# Patient Record
Sex: MALE | Race: OTHER | Employment: FULL TIME | URBAN - METROPOLITAN AREA
[De-identification: names, ages, dates, MRNs, and addresses within clinical notes are randomized per-mention and may not be internally consistent; named-entity substitution may affect disease eponyms.]

---

## 2021-04-29 ENCOUNTER — OFFICE VISIT (OUTPATIENT)
Dept: SURGERY | Facility: CLINIC | Age: 46
End: 2021-04-29
Payer: COMMERCIAL

## 2021-04-29 VITALS
BODY MASS INDEX: 27.4 KG/M2 | WEIGHT: 185 LBS | HEIGHT: 69 IN | DIASTOLIC BLOOD PRESSURE: 80 MMHG | HEART RATE: 77 BPM | TEMPERATURE: 97 F | SYSTOLIC BLOOD PRESSURE: 132 MMHG

## 2021-04-29 DIAGNOSIS — Z01.818 PRE-OP TESTING: ICD-10-CM

## 2021-04-29 DIAGNOSIS — S39.013A STRAIN OF MUSCLE OF RIGHT GROIN REGION: ICD-10-CM

## 2021-04-29 DIAGNOSIS — K42.9 UMBILICAL HERNIA WITHOUT OBSTRUCTION AND WITHOUT GANGRENE: ICD-10-CM

## 2021-04-29 DIAGNOSIS — D12.6 ADENOMATOUS POLYP OF COLON, UNSPECIFIED PART OF COLON: Primary | ICD-10-CM

## 2021-04-29 PROCEDURE — 99244 OFF/OP CNSLTJ NEW/EST MOD 40: CPT | Performed by: COLON & RECTAL SURGERY

## 2021-04-29 PROCEDURE — 3075F SYST BP GE 130 - 139MM HG: CPT | Performed by: COLON & RECTAL SURGERY

## 2021-04-29 PROCEDURE — 3079F DIAST BP 80-89 MM HG: CPT | Performed by: COLON & RECTAL SURGERY

## 2021-04-29 PROCEDURE — 3008F BODY MASS INDEX DOCD: CPT | Performed by: COLON & RECTAL SURGERY

## 2021-04-29 RX ORDER — SODIUM, POTASSIUM,MAG SULFATES 17.5-3.13G
SOLUTION, RECONSTITUTED, ORAL ORAL
Qty: 1 KIT | Refills: 0 | Status: SHIPPED | OUTPATIENT
Start: 2021-04-29

## 2021-04-29 NOTE — PATIENT INSTRUCTIONS
The patient presents today in consultation for 2 separate problems involving 2 different organ systems. The patient's first issue that he presents for today is a history of colon polyps.   The patient had a colonoscopy performed by myself in 2016 seconda personal history of colon polyps, we will be scheduling him to undergo a [facility].   I discussed with the patient that he requires colonoscopy every 3 years secondary to his personal history of colon poly

## 2021-04-29 NOTE — PROGRESS NOTES
Follow Up Visit Note       Active Problems      1. Adenomatous polyp of colon, unspecified part of colon    2. Strain of muscle of right groin region    3. Umbilical hernia without obstruction and without gangrene    4.  Pre-op testing          Chief Compla after eating. He states it will pop out and he will push it back in. He states that it will cause him some discomfort when it is out. It does not cause him any distention, nausea, or vomiting. The patient has had no prior abdominal operations.   He ha children: Not on file      Years of education: Not on file      Highest education level: Not on file    Tobacco Use      Smoking status: Never Smoker    Substance and Sexual Activity      Alcohol use: Yes        Comment: occasionally      Drug use: No    O Wt 185 lb (83.9 kg)   BMI 27.32 kg/m²   Physical Exam  Vitals and nursing note reviewed. Constitutional:       General: He is not in acute distress. Appearance: Normal appearance. He is well-developed. He is not diaphoretic.    HENT:      Head: Normoc Mental Status: He is alert and oriented to person, place, and time. Psychiatric:         Behavior: Behavior normal.         Thought Content:  Thought content normal.         Judgment: Judgment normal.          Assessment   Adenomatous polyp of colon, reveals it to be soft, nondistended, nontender, bowel sounds are normal activity normal pitch. There  is no rebounding tenderness or guarding. There are no signs of ascites or peritonitis. The liver and spleen are nonpalpable.   The patient does have a s Orders Placed This Encounter      Covid-19 Testing by PCR ([facility])      Imaging & Referrals   None    Follow Up  Return for Colonoscopy.     Army Anshul MD

## 2021-05-10 ENCOUNTER — LAB ENCOUNTER (OUTPATIENT)
Dept: LAB | Facility: HOSPITAL | Age: 46
End: 2021-05-10
Attending: COLON & RECTAL SURGERY
Payer: COMMERCIAL

## 2021-05-10 DIAGNOSIS — Z01.818 PRE-PROCEDURAL EXAMINATION: ICD-10-CM

## 2021-05-13 ENCOUNTER — LAB REQUISITION (OUTPATIENT)
Dept: LAB | Facility: HOSPITAL | Age: 46
End: 2021-05-13
Payer: COMMERCIAL

## 2021-05-13 PROCEDURE — 88305 TISSUE EXAM BY PATHOLOGIST: CPT | Performed by: COLON & RECTAL SURGERY

## 2021-05-13 PROCEDURE — 88305 TISSUE EXAM BY PATHOLOGIST: CPT | Performed by: OTOLARYNGOLOGY

## 2021-06-03 ENCOUNTER — PATIENT OUTREACH (OUTPATIENT)
Dept: SURGERY | Facility: CLINIC | Age: 46
End: 2021-06-03

## 2021-06-03 ENCOUNTER — TELEPHONE (OUTPATIENT)
Dept: SURGERY | Facility: CLINIC | Age: 46
End: 2021-06-03

## 2021-06-03 NOTE — TELEPHONE ENCOUNTER
Called pathology as colonoscopy path listed under Chris Bedolla. Informed them Kristine Almanzar completed this procedure and pathology should be under his name. Pathology will correct physician.

## 2021-06-04 ENCOUNTER — MED REC SCAN ONLY (OUTPATIENT)
Dept: SURGERY | Facility: CLINIC | Age: 46
End: 2021-06-04

## 2021-06-21 ENCOUNTER — OFFICE VISIT (OUTPATIENT)
Dept: SURGERY | Facility: CLINIC | Age: 46
End: 2021-06-21
Payer: COMMERCIAL

## 2021-06-21 VITALS
HEIGHT: 69 IN | DIASTOLIC BLOOD PRESSURE: 80 MMHG | WEIGHT: 185 LBS | SYSTOLIC BLOOD PRESSURE: 128 MMHG | BODY MASS INDEX: 27.4 KG/M2 | TEMPERATURE: 97 F | HEART RATE: 75 BPM

## 2021-06-21 DIAGNOSIS — D12.4 ADENOMATOUS POLYP OF DESCENDING COLON: ICD-10-CM

## 2021-06-21 DIAGNOSIS — S39.013A STRAIN OF MUSCLE OF RIGHT GROIN REGION: ICD-10-CM

## 2021-06-21 DIAGNOSIS — K40.90 RIGHT INGUINAL HERNIA: Primary | ICD-10-CM

## 2021-06-21 DIAGNOSIS — K57.90 DIVERTICULOSIS: ICD-10-CM

## 2021-06-21 DIAGNOSIS — K42.9 UMBILICAL HERNIA WITHOUT OBSTRUCTION AND WITHOUT GANGRENE: ICD-10-CM

## 2021-06-21 PROCEDURE — 3074F SYST BP LT 130 MM HG: CPT | Performed by: COLON & RECTAL SURGERY

## 2021-06-21 PROCEDURE — 99214 OFFICE O/P EST MOD 30 MIN: CPT | Performed by: COLON & RECTAL SURGERY

## 2021-06-21 PROCEDURE — 3008F BODY MASS INDEX DOCD: CPT | Performed by: COLON & RECTAL SURGERY

## 2021-06-21 PROCEDURE — 3079F DIAST BP 80-89 MM HG: CPT | Performed by: COLON & RECTAL SURGERY

## 2021-06-21 NOTE — PROGRESS NOTES
Follow Up Visit Note       Active Problems      1. Right inguinal hernia    2. Umbilical hernia without obstruction and without gangrene    3. Adenomatous polyp of descending colon    4. Strain of muscle of right groin region    5.  Diverticulosis, ascendin matter. My total time spent with this patient on today's visit was 30 minutes.   This includes time in preparation, obtaining and reviewing history, performing the examination, counseling and education, ordering tests, referring and communicating with on file      Highest education level: Not on file    Tobacco Use      Smoking status: Never Smoker    Substance and Sexual Activity      Alcohol use: Yes        Comment: occasionally      Drug use: No    Other Topics      Concerns:       Current Outpatient kg)   BMI 27.32 kg/m²   Physical Exam  Vitals and nursing note reviewed. Abdominal:          Comments: Clinical exam today reveals his abdomen to be soft, nondistended, nontender, good bowel sounds. He has an umbilical hernia that is easily reducible. Pathology reports a polyp which I removed at 45 cm from anal verge to be a tubular adenoma. He will require surveillance colonoscopy. Furthermore, the patient did have right-sided diverticulosis in the cecum and ascending colon.   I further counseled th

## 2021-06-21 NOTE — PATIENT INSTRUCTIONS
This patient presents with new onset of a right inguinal hernia. He states that the hernia has been present for a year. He states he has felt a bulge at that site and has been able to reduce it. We knew of a hernia at the umbilicus.   We did not see the above listed diagnoses as well as his new diagnosis of combined umbilical hernia and right inguinal hernia. This patient will undergo a laparoscopic right inguinal herniorrhaphy with mesh combined with umbilical hernia repair.     We will do it at the pa

## 2021-06-24 ENCOUNTER — TELEPHONE (OUTPATIENT)
Dept: SURGERY | Facility: CLINIC | Age: 46
End: 2021-06-24

## 2021-07-12 ENCOUNTER — LAB ENCOUNTER (OUTPATIENT)
Dept: LAB | Facility: HOSPITAL | Age: 46
End: 2021-07-12
Attending: COLON & RECTAL SURGERY
Payer: COMMERCIAL

## 2021-07-12 DIAGNOSIS — K42.9 UMBILICAL HERNIA WITHOUT OBSTRUCTION AND WITHOUT GANGRENE: ICD-10-CM

## 2021-07-12 DIAGNOSIS — K40.90 RIGHT INGUINAL HERNIA: ICD-10-CM

## 2021-07-13 ENCOUNTER — TELEPHONE (OUTPATIENT)
Dept: SURGERY | Facility: CLINIC | Age: 46
End: 2021-07-13

## 2021-07-13 LAB — SARS-COV-2 RNA RESP QL NAA+PROBE: NOT DETECTED

## 2021-07-27 ENCOUNTER — OFFICE VISIT (OUTPATIENT)
Dept: SURGERY | Facility: CLINIC | Age: 46
End: 2021-07-27

## 2021-07-27 VITALS — TEMPERATURE: 97 F | WEIGHT: 185 LBS | HEIGHT: 69 IN | BODY MASS INDEX: 27.4 KG/M2

## 2021-07-27 DIAGNOSIS — Z98.890 HISTORY OF INGUINAL HERNIA REPAIR: Primary | ICD-10-CM

## 2021-07-27 DIAGNOSIS — Z87.19 HISTORY OF INGUINAL HERNIA REPAIR: Primary | ICD-10-CM

## 2021-07-27 PROBLEM — K40.90 RIGHT INGUINAL HERNIA: Status: RESOLVED | Noted: 2021-06-21 | Resolved: 2021-07-27

## 2021-07-27 PROBLEM — K42.9 UMBILICAL HERNIA WITHOUT OBSTRUCTION AND WITHOUT GANGRENE: Status: RESOLVED | Noted: 2021-04-29 | Resolved: 2021-07-27

## 2021-07-27 PROCEDURE — 99024 POSTOP FOLLOW-UP VISIT: CPT | Performed by: PHYSICIAN ASSISTANT

## 2021-07-27 PROCEDURE — 3008F BODY MASS INDEX DOCD: CPT | Performed by: PHYSICIAN ASSISTANT

## 2021-07-27 NOTE — PROGRESS NOTES
Post Operative Visit Note       Active Problems  1. History of inguinal hernia repair         Chief Complaint   Patient presents with:  Post-Op: PO - Inguinal hernia repair DJP 7/15.- PT denies pain denies n/v fever chills.  PT states has normal BMs Take 1-2 tablets by mouth every 4 (four) hours as needed for Pain. (Patient not taking: Reported on 7/27/2021 ), Disp: 40 tablet, Rfl: 0      Review of Systems  The Review of Systems has been reviewed by me during today.   Review of Systems   Constitutional alert and oriented to person, place, and time. Psychiatric:         Behavior: Behavior normal.             Assessment   History of inguinal hernia repair  (primary encounter diagnosis)      Plan   1.  The patient is doing well following inguinal hernia re

## (undated) NOTE — LETTER
21    Patient: Humberto Santiago  : 1975 Visit date: 2021    Dear  Roni Dakins, DO    Thank you for referring Humberto Santiago to my practice. Please find my assessment and plan below.     Assessment   Adenomatous polyp of colon, unspeci be soft, nondistended, nontender, bowel sounds are normal activity normal pitch. There  is no rebounding tenderness or guarding. There are no signs of ascites or peritonitis. The liver and spleen are nonpalpable.   The patient does have a small reducible Milagros Simmons MD   CC: No Recipients

## (undated) NOTE — LETTER
21    Patient: Christal Alexander  : 1975 Visit date: 2021    Dear  Julieta Tracy, DO    Thank you for referring Christal Alexander to my practice. Please find my assessment and plan below.     Assessment   Right inguinal hernia  (primary enc bowel sounds. He has an umbilical hernia that is easily reducible. On standing and Valsalva he has a very easily palpable right inguinal hernia. It reduces easily as well. The penis is normal without hypospadias, both testes are descended bilaterally.